# Patient Record
Sex: FEMALE | Race: WHITE | Employment: FULL TIME | URBAN - METROPOLITAN AREA
[De-identification: names, ages, dates, MRNs, and addresses within clinical notes are randomized per-mention and may not be internally consistent; named-entity substitution may affect disease eponyms.]

---

## 2022-11-14 ENCOUNTER — OFFICE VISIT (OUTPATIENT)
Dept: OBGYN CLINIC | Age: 42
End: 2022-11-14
Payer: COMMERCIAL

## 2022-11-14 VITALS
DIASTOLIC BLOOD PRESSURE: 53 MMHG | HEIGHT: 67 IN | WEIGHT: 140 LBS | BODY MASS INDEX: 21.97 KG/M2 | SYSTOLIC BLOOD PRESSURE: 83 MMHG

## 2022-11-14 DIAGNOSIS — Z01.419 ROUTINE GYNECOLOGICAL EXAMINATION: Primary | ICD-10-CM

## 2022-11-14 DIAGNOSIS — B37.31 CANDIDAL VULVITIS: ICD-10-CM

## 2022-11-14 PROBLEM — F33.8 SEASONAL AFFECTIVE DISORDER (HCC): Status: ACTIVE | Noted: 2022-11-14

## 2022-11-14 PROCEDURE — 99396 PREV VISIT EST AGE 40-64: CPT | Performed by: OBSTETRICS & GYNECOLOGY

## 2022-11-14 RX ORDER — CLOTRIMAZOLE AND BETAMETHASONE DIPROPIONATE 10; .64 MG/G; MG/G
CREAM TOPICAL
Qty: 15 G | Refills: 0 | Status: SHIPPED | OUTPATIENT
Start: 2022-11-14 | End: 2022-11-24

## 2022-11-14 RX ORDER — BUPROPION HYDROCHLORIDE 150 MG/1
150 TABLET ORAL DAILY
COMMUNITY

## 2022-11-14 RX ORDER — BISMUTH SUBSALICYLATE 262 MG
1 TABLET,CHEWABLE ORAL DAILY
COMMUNITY

## 2022-11-14 NOTE — PROGRESS NOTES
ANNUAL EXAM AGES 40-64      Danny Vuong is a [de-identified] P5,  40 y.o. female   No LMP recorded. (Menstrual status: IUD). She presents for her annual checkup. She has been fairly well over the past year with no major medical problems. She has bumped up her Wellbutrin due to seasonal affective disorder and plans to start that soon. She is not having any periods on her Mirena which has been in for the past 4 years. She is having no problems without we discussed that there is now an 8-year indication for the IUD. She did have a mammogram recently which showed some abnormalities they wanted to repeat studies. We do not have a report of that but she does plan to follow through with the repeat studies. She is not been sexually active lately.   She does complain of some external vulvar irritation    Medical History:  Patient Active Problem List   Diagnosis Code    Seasonal affective disorder ([facility] F33.8     Past Medical History:   Diagnosis Date    Cervical high risk HPV (human papillomavirus) test positive     Chlamydia 2010    Decreased libido     Dysplasia of cervix, high grade GEOVANI 2 2006    Dysplasia of cervix, low grade (GEOVANI 1) 10/2017    Frequent Candida vaginitis     Hemorrhoids, thrombosed     IUD (intrauterine device) in place 2018    Mirena    PMS (premenstrual syndrome)     Seasonal affective disorder (HCC)     Severe dysmenorrhea      Past Surgical History:   Procedure Laterality Date    HX LEEP PROCEDURE  2006     OB History    Para Term  AB Living   1 1 1 0 0 1   SAB IAB Ectopic Molar Multiple Live Births   0 0 0 0 0 1      # Outcome Date GA Lbr Emmett/2nd Weight Sex Delivery Anes PTL Lv   1 Term 09 39w0d  8 lb (3.629 kg) F Vag-Spont   SOFI      Name: Kristin Boland     Social History     Socioeconomic History    Marital status: SINGLE    Number of children: 1   Occupational History    Occupation: Dietitian     Comment: [address]   Tobacco Use Smoking status: Former     Packs/day: 1.00     Types: Cigarettes     Start date:      Quit date:      Years since quittin.8    Smokeless tobacco: Never   Vaping Use    Vaping Use: Never used   Substance and Sexual Activity    Alcohol use: Yes    Drug use: Never    Sexual activity: Yes     Partners: Male     Birth control/protection: I.U.D. Family History   Problem Relation Age of Onset    Diabetes Mother     Hypertension Father     Diabetes Maternal Grandmother     Breast Cancer Paternal Grandmother 61     Current Outpatient Medications on File Prior to Visit   Medication Sig Dispense Refill    buPROPion XL (Wellbutrin XL) 150 mg tablet Take 150 mg by mouth daily. multivitamin (ONE A DAY) tablet Take 1 Tablet by mouth daily. No current facility-administered medications on file prior to visit.      Allergies   Allergen Reactions    Augmentin [Amoxicillin-Pot Clavulanate] Nausea Only       Review of Systems:  History obtained from the patient-negative for:  Constitutional: weight loss, fever, night sweats  HEENT: hearing loss, earache, congestion, snoring, sorethroat  CV: chest pain, palpitations, edema  Resp: cough, shortness of breath, wheezing  Breast: breast lumps, nipple discharge, galactorrhea  GI: change in bowel habits, abdominal pain, black or bloody stools  : frequency, dysuria, hematuria, vaginal discharge  MSK: back pain, joint pain, muscle pain  Skin: itching, rash, hives  Neuro: dizziness, headache, confusion, weakness  Psych: anxiety, depression, change in mood  Heme/lymph: bleeding, bruising, pallor    Physical Exam  Visit Vitals  BP (!) 83/53   Ht 5' 7\" (1.702 m)   Wt 140 lb (63.5 kg)   BMI 21.93 kg/m²       Constitutional  Appearance: well-nourished, well developed, alert, in no acute distress    HENT  Head and Face: appears normal    Neck  Inspection/Palpation: normal appearance, no masses or tenderness  Lymph Nodes: no lymphadenopathy present  Thyroid: gland size normal, nontender, no nodules or masses present on palpation    Chest  Respiratory Effort: breathing unlabored  Auscultation: normal breath sounds    Cardiovascular  Heart: Auscultation: regular rate and rhythm without murmur    Breasts  Inspection of Breasts: breasts symmetrical, no skin changes, no discharge present, nipple appearance normal, no skin retraction present  Palpation of Breasts and Axillae: no masses present on palpation, no breast tenderness, dense breasts bilaterally FC changes L>>R  Axillary Lymph Nodes: no lymphadenopathy present    Gastrointestinal  Abdominal Examination: abdomen non-tender to palpation, normal bowel sounds, no masses present  Liver and spleen: no hepatomegaly present, spleen not palpable  Hernias: no hernias identified    Genitourinary  External Genitalia: normal appearance for age, no discharge present, no tenderness present, no inflammatory lesions present, no masses present, no atrophy present  Vagina: normal vaginal vault without central or paravaginal defects, no discharge present, no inflammatory lesions present, no masses present  Bladder: non-tender to palpation  Urethra: appears normal  Cervix: normal IUD string visible  Uterus: normal size, shape and consistency  Adnexa: no adnexal tenderness present, no adnexal masses present  Perineum: perineum within normal limits, no evidence of trauma, no rashes or skin lesions present  Anus: anus within normal limits, no hemorrhoids present  Inguinal Lymph Nodes: no lymphadenopathy present    Skin  General Inspection: no rash, no lesions identified    Neurologic/Psychiatric  Mental Status:  Orientation: grossly oriented to person, place and time  Mood and Affect: mood normal, affect appropriate    Labs:  No results found for this or any previous visit (from the past 12 hour(s)). Assessment:    ICD-10-CM ICD-9-CM    1. Routine gynecological examination  Z01.419 V72.31 PAP IG, APTIMA HPV AND RFX 16/18,45 ([id number])      2. Candidal vulvitis  B37.31 112.1           Plan:  Counseled re: diet, exercise, healthy lifestyle  Rec annual mammogram  Return in about 1 year (around 11/14/2023) for Annual.

## 2022-11-14 NOTE — PROGRESS NOTES
Мария Zamudio is a 40 y.o. female returns for an annual exam     Chief Complaint   Patient presents with    Well Woman       No LMP recorded. (Menstrual status: IUD). Her periods are  absent patient has IUD. Problems:  vaginal irritation outside. Birth Control: IUD. Mirena 12/6/2018. Last Pap: normal obtained 2/3/2022. Last Mammogram: scheduled for next week [facility]. Last Bone Density:  not obtained. Last colonoscopy:  not obtained . 1. Have you been to the ER, urgent care clinic, or hospitalized since your last visit? No    2. Have you seen or consulted any other health care providers outside of the [address] [place] [address] since your last visit?  No    Examination chaperoned by Sonya Ramos MA.

## 2023-05-22 RX ORDER — BUPROPION HYDROCHLORIDE 150 MG/1
150 TABLET ORAL DAILY
COMMUNITY

## 2025-07-18 ENCOUNTER — OFFICE VISIT (OUTPATIENT)
Age: 45
End: 2025-07-18

## 2025-07-18 VITALS
RESPIRATION RATE: 16 BRPM | BODY MASS INDEX: 23.07 KG/M2 | WEIGHT: 147 LBS | DIASTOLIC BLOOD PRESSURE: 72 MMHG | HEART RATE: 69 BPM | SYSTOLIC BLOOD PRESSURE: 112 MMHG | OXYGEN SATURATION: 96 % | HEIGHT: 67 IN

## 2025-07-18 DIAGNOSIS — Z12.4 PAPANICOLAOU SMEAR FOR CERVICAL CANCER SCREENING: Primary | ICD-10-CM

## 2025-07-18 DIAGNOSIS — Z01.419 WELL WOMAN EXAM WITH ROUTINE GYNECOLOGICAL EXAM: ICD-10-CM

## 2025-07-18 DIAGNOSIS — Z12.31 BREAST CANCER SCREENING BY MAMMOGRAM: ICD-10-CM

## 2025-07-18 SDOH — ECONOMIC STABILITY: FOOD INSECURITY: WITHIN THE PAST 12 MONTHS, YOU WORRIED THAT YOUR FOOD WOULD RUN OUT BEFORE YOU GOT MONEY TO BUY MORE.: NEVER TRUE

## 2025-07-18 SDOH — ECONOMIC STABILITY: FOOD INSECURITY: WITHIN THE PAST 12 MONTHS, THE FOOD YOU BOUGHT JUST DIDN'T LAST AND YOU DIDN'T HAVE MONEY TO GET MORE.: NEVER TRUE

## 2025-07-18 ASSESSMENT — PATIENT HEALTH QUESTIONNAIRE - PHQ9
SUM OF ALL RESPONSES TO PHQ QUESTIONS 1-9: 10
1. LITTLE INTEREST OR PLEASURE IN DOING THINGS: NOT AT ALL
6. FEELING BAD ABOUT YOURSELF - OR THAT YOU ARE A FAILURE OR HAVE LET YOURSELF OR YOUR FAMILY DOWN: NOT AT ALL
SUM OF ALL RESPONSES TO PHQ QUESTIONS 1-9: 10
10. IF YOU CHECKED OFF ANY PROBLEMS, HOW DIFFICULT HAVE THESE PROBLEMS MADE IT FOR YOU TO DO YOUR WORK, TAKE CARE OF THINGS AT HOME, OR GET ALONG WITH OTHER PEOPLE: NOT DIFFICULT AT ALL
SUM OF ALL RESPONSES TO PHQ QUESTIONS 1-9: 10
7. TROUBLE CONCENTRATING ON THINGS, SUCH AS READING THE NEWSPAPER OR WATCHING TELEVISION: SEVERAL DAYS
9. THOUGHTS THAT YOU WOULD BE BETTER OFF DEAD, OR OF HURTING YOURSELF: NOT AT ALL
5. POOR APPETITE OR OVEREATING: NEARLY EVERY DAY
4. FEELING TIRED OR HAVING LITTLE ENERGY: NEARLY EVERY DAY
3. TROUBLE FALLING OR STAYING ASLEEP: NEARLY EVERY DAY
SUM OF ALL RESPONSES TO PHQ QUESTIONS 1-9: 10
2. FEELING DOWN, DEPRESSED OR HOPELESS: NOT AT ALL
8. MOVING OR SPEAKING SO SLOWLY THAT OTHER PEOPLE COULD HAVE NOTICED. OR THE OPPOSITE, BEING SO FIGETY OR RESTLESS THAT YOU HAVE BEEN MOVING AROUND A LOT MORE THAN USUAL: NOT AT ALL

## 2025-07-18 NOTE — PROGRESS NOTES
Teresa Mitchell is a 44 y.o. female returns for an annual exam     Chief Complaint   Patient presents with    Annual Exam       No LMP recorded.  Her periods are absent in flow.  Problems: problems - recent cramping and spotting   Birth Control: IUD.  Last Pap: normal obtained 11/14/2022.  She does have a history of JULITO 2, 3 or cervical cancer.   Last Mammogram: has never had a mammogram.     Last Bone Density:  not obtained.  Last colonoscopy:  not obtained.      1. Have you been to the ER, urgent care clinic, or hospitalized since your last visit? No    2. Have you seen or consulted any other health care providers outside of the [facility] since your last visit? Yes    Examination chaperoned by Uyen Pritchett CMA.

## 2025-07-18 NOTE — PROGRESS NOTES
ANNUAL EXAM AGES 40-64      History:  Teresa Mitchell is a 44 y.o. year old  White (non-) female   No LMP recorded. (Menstrual status: IUD).    She presents for her annual checkup.     Nurse Notes:  No LMP recorded.  Her periods are absent in flow.  Problems: problems - recent cramping and spotting   Birth Control: IUD.  Last Pap: normal obtained 2022.  She does have a history of JULITO 2, 3 or cervical cancer.   Last Mammogram: has been 2 years   Last Bone Density:  not obtained.  Last colonoscopy:  not obtained.    History of Present Illness  The patient presents for a well-woman exam.    She reports experiencing weight fluctuations, with a rapid increase from size 2 to size 6, followed by an unexpected weight loss. She has been using an intrauterine device (IUD) for the past 7 years and recently started menstruating again. Her menstrual cramps have intensified recently. She is considering replacing her IUD and inquires about the potential benefits of exercise in this regard. Pamprin is effective for managing her cramps. She occasionally notices a slight discharge on her underwear and is unsure if this is normal. She reports no unusual discharges, yeast infections, or bladder issues. She does not believe she needs to be tested for sexually transmitted diseases (STDs) at this time.    She has been on Wellbutrin for several years and continues to take it. She reports no suicidal thoughts but admits to struggling with sleep and maintaining focus. She is considering a sleep study.    GYNECOLOGICAL HISTORY:  - Menstrual pain: Present    CONTRACEPTION:  - Type: Intrauterine device (IUD)  - History: 7 years of use, considering replacement  - Menopausal status: Perimenopausal    PAST GYNECOLOGIC HISTORY:  - LEEP procedure in     FAMILY HISTORY  Her grandmother had breast cancer.    Problem List:  Patient Active Problem List    Diagnosis Date Noted    Seasonal affective disorder 2022

## 2025-07-18 NOTE — PROGRESS NOTES
Teresa Mitchell is a 44 y.o. female returns for an annual exam     No chief complaint on file.      No LMP recorded.  Her periods are absent in flow.  Problems: {problem:12637}  Birth Control: IUD.  Last Pap: normal obtained 11/14/2022.  She does have a history of JULITO 2, 3 or cervical cancer.   Last Mammogram: {mammo:07998}.     Last Bone Density:  not obtained.  Last colonoscopy:  not obtained.      1. Have you been to the ER, urgent care clinic, or hospitalized since your last visit? {YES/NO:19726::\"Yes- When: ***. Where: ***.  Reason for visit: ***\"}    2. Have you seen or consulted any other health care providers outside of the [facility] since your last visit? Yes    Examination chaperoned by PATRICK HEREDIA CMA.

## 2025-07-22 LAB
CYTOLOGIST CVX/VAG CYTO: NORMAL
CYTOLOGY CVX/VAG DOC CYTO: NORMAL
CYTOLOGY CVX/VAG DOC THIN PREP: NORMAL
DX ICD CODE: NORMAL
HPV GENOTYPE REFLEX: NORMAL
HPV I/H RISK 4 DNA CVX QL PROBE+SIG AMP: NEGATIVE
OTHER STN SPEC: NORMAL
SERVICE CMNT-IMP: NORMAL
STAT OF ADQ CVX/VAG CYTO-IMP: NORMAL